# Patient Record
Sex: FEMALE | Race: BLACK OR AFRICAN AMERICAN | NOT HISPANIC OR LATINO | URBAN - METROPOLITAN AREA
[De-identification: names, ages, dates, MRNs, and addresses within clinical notes are randomized per-mention and may not be internally consistent; named-entity substitution may affect disease eponyms.]

---

## 2018-01-22 ENCOUNTER — IMPORTED ENCOUNTER (OUTPATIENT)
Dept: URBAN - METROPOLITAN AREA CLINIC 1 | Facility: CLINIC | Age: 83
End: 2018-01-22

## 2018-01-22 PROBLEM — Z96.1: Noted: 2018-01-22

## 2018-01-22 PROBLEM — H04.123: Noted: 2018-01-22

## 2018-01-22 PROBLEM — H26.491: Noted: 2018-01-22

## 2018-01-22 PROBLEM — H16.143: Noted: 2018-01-22

## 2018-01-22 PROBLEM — H40.1232: Noted: 2018-01-22

## 2018-01-22 PROBLEM — H43.812: Noted: 2018-01-22

## 2018-01-22 PROCEDURE — 92014 COMPRE OPH EXAM EST PT 1/>: CPT

## 2018-01-22 PROCEDURE — 92133 CPTRZD OPH DX IMG PST SGM ON: CPT

## 2018-01-22 NOTE — PATIENT DISCUSSION
1.  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 IOP stable on current gtts. Cont Latanoprost QHS OU. Compliance with mesd. 2.  PCO OD- Observe. 3.  Pseudophakia OU - (Standard OU) 4. KASSI w/ PEK OU- Begin ATs QID OU Routinely. 5.  PVD w/o Tear OS- RD precautions. Letter to PCP Return for an appointment in 6 mo 10 VF 24-2 OU glare with Dr. Brodie Garcia.

## 2018-07-30 ENCOUNTER — IMPORTED ENCOUNTER (OUTPATIENT)
Dept: URBAN - METROPOLITAN AREA CLINIC 1 | Facility: CLINIC | Age: 83
End: 2018-07-30

## 2018-07-30 PROBLEM — H26.491: Noted: 2018-07-30

## 2018-07-30 PROBLEM — H40.1232: Noted: 2018-07-30

## 2018-07-30 PROBLEM — Z96.1: Noted: 2018-07-30

## 2018-07-30 PROBLEM — H16.143: Noted: 2018-07-30

## 2018-07-30 PROBLEM — H04.123: Noted: 2018-07-30

## 2018-07-30 PROCEDURE — 92012 INTRM OPH EXAM EST PATIENT: CPT

## 2018-07-30 PROCEDURE — 92015 DETERMINE REFRACTIVE STATE: CPT

## 2018-07-30 PROCEDURE — 92083 EXTENDED VISUAL FIELD XM: CPT

## 2018-07-30 NOTE — PATIENT DISCUSSION
1.  Moderate Low tension glaucoma OU -(.85 OU) IOP stable OU . Early VF defects noted OU. -Patient to continue with current gtt regimen. (latanoprost OU QHS) Condition was discussed with patient and patient understands. Will continue to monitor patient for any progression in condition. Patient was advised to call us with any problems questions or concerns. 2.  KASSI w/ PEK OU-The increase of artificial tears OU BID Routinely were recommended. 3.  Pseudophakia OU - standard4. PCO  OD (Posterior Capsule Opacification)  Observe and consider yag cap when pt feels pco visually significant and visual acuity decreases to appropriate level. 5. Return for an appointment for 6mo/30 OCT with Dr. Jasmyn Miller.

## 2019-02-11 ENCOUNTER — IMPORTED ENCOUNTER (OUTPATIENT)
Dept: URBAN - METROPOLITAN AREA CLINIC 1 | Facility: CLINIC | Age: 84
End: 2019-02-11

## 2019-02-11 PROBLEM — H40.1232: Noted: 2019-02-11

## 2019-02-11 PROBLEM — H26.493: Noted: 2019-02-11

## 2019-02-11 PROBLEM — H43.812: Noted: 2019-02-11

## 2019-02-11 PROBLEM — H16.143: Noted: 2019-02-11

## 2019-02-11 PROBLEM — H04.123: Noted: 2019-02-11

## 2019-02-11 PROBLEM — Z96.1: Noted: 2019-02-11

## 2019-02-11 PROBLEM — H26.491: Noted: 2019-02-11

## 2019-02-11 PROCEDURE — 92133 CPTRZD OPH DX IMG PST SGM ON: CPT

## 2019-02-11 PROCEDURE — 92014 COMPRE OPH EXAM EST PT 1/>: CPT

## 2019-02-11 NOTE — PATIENT DISCUSSION
1.  Moderate Low Tension Glaucoma OU (CD 0.85 OU) - No progression by OCT. IOP stable cont Latanoprost QHS OU. Condition was discussed with patient and patient understands. Will continue to monitor patient for any progression in condition. Patient was advised to call us with any problems questions or concerns. 2.  KASSI w/ PEK OU- Recommend ATs TID OU routinely 3. PCO  OD (Posterior Capsule Opacification)  Observe and consider yag cap when pt feels pco visually significant and visual acuity decreases to appropriate level. 4. Pseudophakia OU - (Standard OU) 5. PVD w/o Tear OS- RD precautions. Return for an appointment in 6 months 10/HVF with Dr. Yordy Wagoner.

## 2019-09-16 ENCOUNTER — IMPORTED ENCOUNTER (OUTPATIENT)
Dept: URBAN - METROPOLITAN AREA CLINIC 1 | Facility: CLINIC | Age: 84
End: 2019-09-16

## 2019-09-16 PROBLEM — H40.1232: Noted: 2019-09-16

## 2019-09-16 PROCEDURE — 92083 EXTENDED VISUAL FIELD XM: CPT

## 2019-09-16 PROCEDURE — 92012 INTRM OPH EXAM EST PATIENT: CPT

## 2019-09-16 NOTE — PATIENT DISCUSSION
1.  Moderate Low Tension Glaucoma OU (CD 0.85 OU) - Slight progression by OCT OU. IOP stable OU cont Latanoprost QHS OU. Consider adding additional gtt with any more future progression. 2.  KASSI w/ PEK OU- Cont ATs TID OU routinely 3. PCO OD (Posterior Capsule Opacification)   4. Pseudophakia OU - (Standard OU) 5. PVD w/o Tear OSReturn for an appointment in 6 mo 30/OCT with Dr. Zurdo Diana.

## 2019-11-19 ENCOUNTER — IMPORTED ENCOUNTER (OUTPATIENT)
Dept: URBAN - METROPOLITAN AREA CLINIC 1 | Facility: CLINIC | Age: 84
End: 2019-11-19

## 2019-11-19 PROBLEM — H40.1233: Noted: 2019-11-19

## 2019-11-19 PROCEDURE — 92012 INTRM OPH EXAM EST PATIENT: CPT

## 2019-11-19 NOTE — PATIENT DISCUSSION
KASSI w/ PEK OU- Cont ATs TID OU routinely 3. PCO OD (Posterior Capsule Opacification)   4. Pseudophakia OU - (Standard OU) 5. PVD w/o Tear OSReturn for an appointment for Return as scheduled 30/OCT with Dr. Lili Brambila.

## 2019-11-19 NOTE — PATIENT DISCUSSION
1.  Advanced Low tension glaucoma OU - HVF shows progression OU superior and inferior arcuate. IOP stable cont Latanoprost QHS OU. **Add gtt if any progression by OCT. Condition was discussed with patient and patient understands. Will continue to monitor patient for any progression in condition. Patient was advised to call us with any problems questions or concerns. 2.  KASSI w/ PEK OU- Cont ATs TID OU routinely 3. PCO OD (Posterior Capsule Opacification)   4. Pseudophakia OU - (Standard OU) 5. PVD w/o Tear OSReturn for an appointment for Return as scheduled 30/OCT with Dr. Laney Street.

## 2020-03-16 ENCOUNTER — IMPORTED ENCOUNTER (OUTPATIENT)
Dept: URBAN - METROPOLITAN AREA CLINIC 1 | Facility: CLINIC | Age: 85
End: 2020-03-16

## 2020-03-16 PROBLEM — H04.123: Noted: 2020-03-16

## 2020-03-16 PROBLEM — H26.491: Noted: 2020-03-16

## 2020-03-16 PROBLEM — H16.143: Noted: 2020-03-16

## 2020-03-16 PROBLEM — Z96.1: Noted: 2020-03-16

## 2020-03-16 PROBLEM — H43.812: Noted: 2020-03-16

## 2020-03-16 PROBLEM — H40.1233: Noted: 2020-03-16

## 2020-03-16 PROCEDURE — 92133 CPTRZD OPH DX IMG PST SGM ON: CPT

## 2020-03-16 PROCEDURE — 92014 COMPRE OPH EXAM EST PT 1/>: CPT

## 2020-03-16 NOTE — PATIENT DISCUSSION
1.  Advanced tension glaucoma OU (CD 0.85 OU) - No progression by OCT. IOP stable cont Latanoprost QHS OU. Condition was discussed with patient and patient understands. Will continue to monitor patient for any progression in condition. Patient was advised to call us with any problems questions or concerns. 2.  KASSI w/ PEK OU- Recommend ATs TID OU routinely 3. PCO  OD (Posterior Capsule Opacification)  Observe and consider yag cap when pt feels pco visually significant and visual acuity decreases to appropriate level. 4. Pseudophakia OU - (Standard OU) 5. PVD w/o Tear OS- RD precautions. Return for an appointment in October 10/[facility] 24-2 with Dr. Keysha Canela.

## 2020-07-30 NOTE — PATIENT DISCUSSION
Surgery  Counseling: I have discussed the option of glasses versus cataract surgery versus following . It was explained that when vision no longer meets the patient's visual needs and a new prescription for glasses is not likely to improve all of the patient's visual symptoms, the option of cataract surgery is a reasonable next step. It was explained that there is no guarantee that removing the cataract will improve their visual symptoms, however; it is believed that the cataract is contributing to the patient's visual impairment and surgery may significantly improve both the visual and functional status of the patient. The risks, benefits and alternatives of surgery were discussed with the patient. After this discussion, the patient desires to proceed with cataract surgery with implantation of an intraocular lens to improve vision for distance and glare .

## 2020-07-30 NOTE — PATIENT DISCUSSION
Continue: prednisol ace-gatiflox-bromfen (prednisol ace-gatiflox-bromfen): drops,suspension: 1-0.5-0.075% 1 drop three times a day as directed into affected eye 07-

## 2020-07-30 NOTE — PATIENT DISCUSSION
***This patient had refractive cataract surgery performed. A toric IOL was placed to achieve a target refraction of plano (which should provide them with satisfactory distance vision with the aid of glasses/contact lenses). ***

## 2020-08-13 NOTE — PATIENT DISCUSSION
***This patient had traditional / refractive cataract surgery performed. A monofocal IOL was placed to achieve a target refraction of plano (which should provide them with satisfactory near vision / vision with the aid of glasses/contact lenses). ***

## 2020-09-18 NOTE — PATIENT DISCUSSION
TRABECULECTOMY WITH MITOMYCIN LEFT EYE: Patient instructed on glaucoma diagnosis. IOP in OS not at target. I have reviewed the findings and  have asked appropriate questions. Patient will schedule surgical intervention to reduce the risk of progression . Risks, benefits and alternatives explained. The patient understands that vision will not recover and that this procedure is to try to slow progressive deterioration of the vision and visual field in this eye. I have also reviewed the need for follow up and possible suture lysis in the post operative period. Patient agrees with plan and has decided to schedule Trabeculectomy with Mitomycin-C in the left eye. Patient will use Ofloxacin QID in OS starting 3 days prior to surgery. Informed consents were reviewed with patient and signed. A copy was provided along with pre-operative instructions.  Patient is scheduled for

## 2020-09-18 NOTE — PATIENT DISCUSSION
GLAUCOMA:  I have talked with the patient about my impressions, explained our treatment plan, and have answered all questions and patient understands. Continue present eye drops. D/C maxitrol due to steroid response. Added Erythromycin to replace this. Will have her return for a pressure check post maxitrol use.  Will also schedule surgery date today for [facility]. in October

## 2020-09-18 NOTE — PATIENT DISCUSSION
New Prescription: prednisolone acetate (prednisolone acetate): drops,suspension: 1% 1 drop four times a day into left eye 09-

## 2020-09-18 NOTE — PATIENT DISCUSSION
Continue: timolol maleate (timolol maleate): drops: 0.5% 1 drop twice a day as directed into both eyes 04-

## 2020-09-18 NOTE — PATIENT DISCUSSION
Stopped Today: Maxitrol (neomycin-polymyxin-dexameth): ointment: 3.5-10,000-0.1 mg-unit/g-% a small amount at bedtime as directed into both eyes 06-

## 2020-09-18 NOTE — PATIENT DISCUSSION
New Prescription: erythromycin (erythromycin): ointment: 5 mg/gram (0.5 %) a small amount once a day as directed into right eye 09-

## 2020-10-12 NOTE — PATIENT DISCUSSION
Continue: erythromycin (erythromycin): ointment: 5 mg/gram (0.5 %) a small amount once a day as directed into right eye 09-

## 2020-10-12 NOTE — PATIENT DISCUSSION
IOP continuing to climb in the left eye. Patient scheduled to proceed with surgical intervention October 21, 2020.

## 2020-10-12 NOTE — PATIENT DISCUSSION
Continue: prednisolone acetate (prednisolone acetate): drops,suspension: 1% 1 drop four times a day into left eye 09-

## 2020-10-12 NOTE — PATIENT DISCUSSION
TRABECULECTOMY WITH MITOMYCIN LEFT EYE: Patient instructed on glaucoma diagnosis. IOP in OS not at target. I have reviewed the findings and  have asked appropriate questions. Patient will schedule surgical intervention to reduce the risk of progression . Risks, benefits and alternatives explained. The patient understands that vision will not recover and that this procedure is to try to slow progressive deterioration of the vision and visual field in this eye. I have also reviewed the need for follow up and possible suture lysis in the post operative period. Patient agrees with plan and has decided to schedule Trabeculectomy with Mitomycin-C in the left eye. Patient will use Ofloxacin QID in OS starting 3 days prior to surgery. Informed consents were reviewed with patient and signed. A copy was provided along with pre-operative instructions.

## 2020-10-22 NOTE — PATIENT DISCUSSION
POST-OP INSTRUCTIONS: Reviewed instructions for post op care with patient. Pressure is low, but usually stabilizes at around 5 to 6 days. Instructed  no exercise and keep head higher than heart until eye stabilizes. Instructed on how to use drops. Instructed to call if pain, redness, or decreased vision occurs. Do not use any glaucoma drops in OS, but continue drops in OD. Use pred-forte QID OS will slowly taper for the next 4 months. Ofloxacin QID OS for 10 days. A copy of Post op instructions were reviewed with patient  and a copy was given to patient today.

## 2020-10-30 NOTE — PATIENT DISCUSSION
GLAUCOMA:  I have talked with the patient about my impressions, explained our treatment plan, and have answered all questions and patient understands. Continue present eye drops OD. Stop glaucoma drops OS. Increase PF OS to 6 times aday. Gentle massage TID. Patient to follow up 2 weeks

## 2020-11-13 NOTE — PATIENT DISCUSSION
3 WEEK PO :  I have talked with the patient about my impressions, explained our treatment plan, and have answered all questions and patient understands. Continue present eye drops. Return for PO LOAN December 15th.

## 2020-12-15 NOTE — PATIENT DISCUSSION
GLAUCOMA:  I have talked with the patient about my impressions, explained our treatment plan, and have answered all questions and patient understands. Continue present eye drops. Patient to follow up April - dilate OU.  No testing

## 2020-12-29 ENCOUNTER — IMPORTED ENCOUNTER (OUTPATIENT)
Dept: URBAN - METROPOLITAN AREA CLINIC 1 | Facility: CLINIC | Age: 85
End: 2020-12-29

## 2020-12-29 PROBLEM — H40.1233: Noted: 2020-12-29

## 2020-12-29 PROCEDURE — 99213 OFFICE O/P EST LOW 20 MIN: CPT

## 2020-12-29 NOTE — PATIENT DISCUSSION
1.  Advanced tension glaucoma OU (CD 0.85 OU) - IOP stable cont Latanoprost QHS OU. Condition was discussed with patient and patient understands. Will continue to monitor patient for any progression in condition. Patient was advised to call us with any problems questions or concerns. 2.  KASSI w/ PEK OU- Recommend ATs TID OU routinely 3. PCO  OD (Posterior Capsule Opacification)  Observe and consider yag cap when pt feels pco visually significant and visual acuity decreases to appropriate level. 4. Pseudophakia OU - (Standard OU) 5. H/o PVD w/o Tear OSReturn for an appointment in 4 months 30/OCT with Dr. Keysha Canela.

## 2021-04-27 ENCOUNTER — IMPORTED ENCOUNTER (OUTPATIENT)
Dept: URBAN - METROPOLITAN AREA CLINIC 1 | Facility: CLINIC | Age: 86
End: 2021-04-27

## 2021-04-27 PROBLEM — H26.491: Noted: 2021-04-27

## 2021-04-27 PROBLEM — H43.812: Noted: 2021-04-27

## 2021-04-27 PROBLEM — H04.123: Noted: 2021-04-27

## 2021-04-27 PROBLEM — H40.1233: Noted: 2021-04-27

## 2021-04-27 PROBLEM — Z96.1: Noted: 2021-04-27

## 2021-04-27 PROBLEM — H16.143: Noted: 2021-04-27

## 2021-04-27 PROCEDURE — 99214 OFFICE O/P EST MOD 30 MIN: CPT

## 2021-04-27 PROCEDURE — 92133 CPTRZD OPH DX IMG PST SGM ON: CPT

## 2021-04-27 NOTE — PATIENT DISCUSSION
1.  Advanced Low tension glaucoma OU (CD 0.85 OU) - No progression by OCT. IOP stable continue Latanoprost QHS OU. Condition was discussed with patient and patient understands. Will continue to monitor patient for any progression in condition. Patient was advised to call us with any problems questions or concerns. 2.  KASSI w/ PEK OU- Recommend ATs QID OU routinely 3. PCO  OD (Posterior Capsule Opacification)  Observe and consider yag cap when pt feels pco visually significant and visual acuity decreases to appropriate level. 4. Pseudophakia OU - (Standard OU) 5. PVD w/o Tear OS- RD precautions. Return for an appointment in 6 months 10/HVF with Dr. Zina Tyler.

## 2021-04-30 NOTE — PATIENT DISCUSSION
Stopped Today: prednisolone acetate (prednisolone acetate): drops,suspension: 1% 1 drop four times a day into left eye 09-

## 2021-04-30 NOTE — PATIENT DISCUSSION
CORNEAL EDEMA:  I have talked with the patient about my impressions, explained our treatment plan, and have answered all questions. Started [facility] in the right eye. Would like for her to follow up with Dr. Sorin Bingham for further evaluation.

## 2021-09-30 NOTE — PATIENT DISCUSSION
"Multifocal, Trifocal and Extended-depth-of-focus lens (EDOF) - It was explained that multifocal/trifocal and extended depth of focus lenses split light and this can be associated with a decrease in contrast sensitivity, depth of focus, a double image and ghosting which may or may not resolve over time. Multifocal/trifocal lenses are lighting dependent. In low or dim lighting, glasses may be needed for optimal near vision. It was explained that rings, halos, starbursts or spider webs around point sources of light (headlights, tail lights, street lights, neon signs, the moon, stars, etc.) will be present indefinitely after multifocal/trifocal/EDOF lens implantation (with the exception of the Vivity MF IOL) While the majority of patients do not find this limit's their night time activities to include driving, in rare instances, it can.  It was explained that these lenses are designed to satisfy a defined area of near vision, or ""sweet spot""
"Surgery Drops: I have given the patient the option to chose whether they would like a prescribed regimen of drops or an all-in-one drop for use before and after cataract surgery. They have elected to use the all-in-one option of Pred-Gati-Brom (prednisolone acetate, gatifloxacin and bromfenac). Patient to administer ""as directed"". "
***Refer to Patient Visual Assessment Questionnaire (scanned into document center). ***
**For patient's undergoing cataract surgery- In the event you decline a refractive package, traditional cataract surgery will be performed and a monofocal IOL will be implanted. Patient understands that they will need glasses for distance, intermediate and near vision.
CATARACT, OU - VISUALLY SIGNIFICANT OD &gt; OS. SCHEDULE SX OD THEN LATER IN OS IF VISUAL SYMPTOMS PERSIST.  GLS RX GIVEN TO FILL IF DESIRES IN THE EVENT PT DOES NOT PROCEED W/ SX.
COUNSELING:
Continue: prednisol ace-gatiflox-bromfen (prednisol ace-gatiflox-bromfen): drops,suspension: 1-0.5-0.075% 1 drop three times a day as directed into affected eye 07-
General:
I have discussed the options of a multifocal/trifocal and extended-depth-of-focus lens. It was explained that these options provide vision at either distance/intermediate or distance/near or distance/intermediate/near; however, compromises to achieve vision at varying distances do exist and these compromises may result in a reduced quality of vision. It was explained to the patient that the visual performance and dependence on glasses varies from patient to patient and while some may perform activities without glasses there are others who need glasses for most or all of their activities. It was emphasized to the patient that multifocal/trifocal and EDOF options do not eliminate the need for glasses at all times. It was emphasized to the patient that the goal of refractive cataract surgery is reducing spectacle dependence not complete spectacle freedom.
It was discussed that while the success rate of cataract surgery is high (success being defined as removing a lens and replacing it with an artificial lens) the success rate of getting the prescription corrected to meet or approximate the patients goals is lower than it is with LASIK (about 75-80%). These options include: correction of astigmatism with limbal relaxing incision(s), LenSx arcuate incision(s) and/or a toric IOL, monovision, nanovision, multifocal/trifocal IOL, and EDOF IOL. The patient was advised that if refractive cataract surgery does not meet their expectations and glasses provide an increased quality of vision, glasses would be prescribed and if the patient is a candidate, a LASIK touch up may be offered. However, if the lenses are limiting their vision or activities and the vision is neither correctable nor satisfactory with glasses, then an IOL exchange may be considered.
Medications:
REFRACTIVE ERROR, OU - DISCUSSED OPTION OF CORRECTING AT THE TIME OF CATARACT SURGERY. PT UNDERSTANDS AND ELECTS TO CONSIDER THEIR OPTIONS.
Refractive Error - a problem focusing light accurately onto the retina due to the shape of the eye. Presbyopia is a gradual, age-related loss of the eye's ability to focus actively on nearby objects. The most common types of refractive error are near-sightedness; far-sightedness, astigmatism, and presbyopia. I have discussed the options for refractive surgery to decrease dependency on glasses and/or contact lenses after surgery.
Refractive error / Presbyopic Correction Counseling:
Start 1 day prior to surgery
Surgery Counseling: I have discussed the option of glasses versus cataract surgery versus following. It was explained that when the patients vision no longer meets their visual needs and a glasses prescription does not improve visual symptoms, the option of cataract surgery is a reasonable next step. It was explained that there is no guarantee that removing the cataract will improve their visual symptoms, however; it is believed that the cataract is contributing to the patient's visual impairment and surgery may improve both the visual and functional status of the patient. The risks, benefits and alternatives of surgery were discussed with the patient. After this discussion, the patient desires to proceed with cataract surgery with implantation of an intraocular lens to improve vision for glare while driving at night.
spouse

## 2021-11-01 ENCOUNTER — PREPPED CHART (OUTPATIENT)
Dept: URBAN - METROPOLITAN AREA CLINIC 1 | Facility: CLINIC | Age: 86
End: 2021-11-01

## 2021-11-01 ENCOUNTER — IMPORTED ENCOUNTER (OUTPATIENT)
Dept: URBAN - METROPOLITAN AREA CLINIC 1 | Facility: CLINIC | Age: 86
End: 2021-11-01

## 2021-11-01 PROBLEM — H04.123: Noted: 2021-11-01

## 2021-11-01 PROBLEM — H40.1233: Noted: 2021-11-01

## 2021-11-01 PROBLEM — H16.143: Noted: 2021-11-01

## 2021-11-01 PROCEDURE — 92012 INTRM OPH EXAM EST PATIENT: CPT

## 2021-11-01 PROCEDURE — 92083 EXTENDED VISUAL FIELD XM: CPT

## 2021-11-01 NOTE — PATIENT DISCUSSION
1.  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 IOP stable continue Latanoprost QHS OU. Condition was discussed with patient and patient understands. Will continue to monitor patient for any progression in condition. Patient was advised to call us with any problems questions or concerns. 2.  KASSI w/ PEK OU - Recommend ATs QID OU routinely 3. PCO OD - (Posterior Capsule Opacification)  Observe and consider yag cap when pt feels pco visually significant and visual acuity decreases to appropriate level. 4. Pseudophakia OU - (Standard OU) 5. PVD w/o Tear OS- RD precautions. Return for an appointment in 6 months 30/OCT/glare with Dr. Keysha Canela.

## 2021-11-09 NOTE — PATIENT DISCUSSION
Target IOP 21 and below. IOP ok for now on Azopt and Timolol. No known allergy to other medications at this time. Can try other drops if needed.

## 2022-02-22 ENCOUNTER — ESTABLISHED PATIENT (OUTPATIENT)
Dept: URBAN - METROPOLITAN AREA CLINIC 1 | Facility: CLINIC | Age: 87
End: 2022-02-22

## 2022-02-22 DIAGNOSIS — B02.32: ICD-10-CM

## 2022-02-22 PROCEDURE — 92012 INTRM OPH EXAM EST PATIENT: CPT

## 2022-02-22 ASSESSMENT — TONOMETRY
OS_IOP_MMHG: 14
OD_IOP_MMHG: 12
OS_IOP_MMHG: 11
OD_IOP_MMHG: 12

## 2022-02-22 ASSESSMENT — VISUAL ACUITY
OS_CC: 20/20
OS_CC: 20/60
OD_CC: J1
OD_CC: 20/30 -2
OD_CC: 20/25
OS_CC: J1

## 2022-02-22 NOTE — PATIENT DISCUSSION
(CD 0.85 OU) - IOP stable, continue Latanoprost QHS OU. Condition was discussed with patient and patient understands. Will continue to monitor patient for any progression in condition. Patient was advised to call us with any problems, questions, or concerns.

## 2022-03-03 ENCOUNTER — FOLLOW UP (OUTPATIENT)
Dept: URBAN - METROPOLITAN AREA CLINIC 1 | Facility: CLINIC | Age: 87
End: 2022-03-03

## 2022-03-03 DIAGNOSIS — B02.32: ICD-10-CM

## 2022-03-03 DIAGNOSIS — G50.0: ICD-10-CM

## 2022-03-03 PROCEDURE — 99213 OFFICE O/P EST LOW 20 MIN: CPT

## 2022-03-03 ASSESSMENT — VISUAL ACUITY
OS_SC: 20/60+2
OD_SC: 20/50+1

## 2022-03-03 ASSESSMENT — TONOMETRY
OS_IOP_MMHG: 12
OD_IOP_MMHG: 12

## 2022-03-03 NOTE — PATIENT DISCUSSION
(CD 0.85 OU) IOP stable. Continue Latanoprost QHS OU. Condition was discussed with patient and patient understands. Will continue to monitor patient for any progression in condition. Patient was advised to call us with any problems, questions, or concerns.

## 2022-03-03 NOTE — PATIENT DISCUSSION
Quiet. Continue Pred BID OS. Continue Valtrex 500mg PO BID x 1 month disp#60.  D/c Erthromycin Marianne.

## 2022-04-03 ASSESSMENT — VISUAL ACUITY
OD_SC: 20/30
OD_SC: 20/30-2
OS_SC: 20/30-2
OD_SC: 20/30-2
OD_SC: 20/30
OD_SC: 20/25
OS_SC: 20/20
OD_CC: J2
OD_GLARE: 20/400
OS_CC: 20/50
OD_SC: 20/60
OD_SC: 20/30
OS_SC: 20/30
OS_CC: J1
OS_GLARE: 20/400
OS_CC: J2
OS_SC: 20/40
OD_CC: J1
OD_GLARE: 20/400
OD_CC: 20/50
OS_SC: 20/30
OU_SC: 20/30
OS_SC: 20/25
OU_CC: J1-2
OS_SC: 20/30
OS_CC: J1
OS_SC: 20/50
OD_SC: 20/30
OS_SC: 20/40+1
OD_CC: J1
OD_SC: 20/30

## 2022-04-03 ASSESSMENT — TONOMETRY
OS_IOP_MMHG: 11
OD_IOP_MMHG: 12
OS_IOP_MMHG: 11
OD_IOP_MMHG: 13
OS_IOP_MMHG: 12
OD_IOP_MMHG: 12
OS_IOP_MMHG: 13
OS_IOP_MMHG: 12
OD_IOP_MMHG: 12
OS_IOP_MMHG: 12
OS_IOP_MMHG: 11
OD_IOP_MMHG: 11
OD_IOP_MMHG: 11
OS_IOP_MMHG: 12
OS_IOP_MMHG: 11
OD_IOP_MMHG: 11

## 2022-05-13 ENCOUNTER — COMPREHENSIVE EXAM (OUTPATIENT)
Dept: URBAN - METROPOLITAN AREA CLINIC 1 | Facility: CLINIC | Age: 87
End: 2022-05-13

## 2022-05-13 DIAGNOSIS — B02.32: ICD-10-CM

## 2022-05-13 DIAGNOSIS — H40.1233: ICD-10-CM

## 2022-05-13 DIAGNOSIS — H43.812: ICD-10-CM

## 2022-05-13 DIAGNOSIS — H04.123: ICD-10-CM

## 2022-05-13 DIAGNOSIS — Z96.1: ICD-10-CM

## 2022-05-13 DIAGNOSIS — H26.491: ICD-10-CM

## 2022-05-13 DIAGNOSIS — G50.0: ICD-10-CM

## 2022-05-13 PROCEDURE — 92015 DETERMINE REFRACTIVE STATE: CPT

## 2022-05-13 PROCEDURE — 99214 OFFICE O/P EST MOD 30 MIN: CPT

## 2022-05-13 ASSESSMENT — VISUAL ACUITY
OS_CC: J3
OD_CC: 20/30
OS_CC: 20/50
OD_CC: J3

## 2022-05-13 ASSESSMENT — TONOMETRY
OD_IOP_MMHG: 12
OS_IOP_MMHG: 11

## 2022-05-13 NOTE — PATIENT DISCUSSION
(CD 0.85 OU) Unable to preform OCT today. IOP stable. Continue Latanoprost QHS OU. Condition was discussed with patient and patient understands. Will continue to monitor patient for any progression in condition. Patient was advised to call us with any problems, questions, or concerns.

## 2022-05-13 NOTE — PATIENT DISCUSSION
Observe. Recommend patients daughter to tape left lower lid so it does not flip in. If tape does not help, will consider entropion repair.

## 2022-06-10 NOTE — PATIENT DISCUSSION
Will have her follow with Dr. Dodie Hamilton and Dr. Dodie Hamilton can refer to glaucoma specialist if indicated.

## 2022-06-10 NOTE — PATIENT DISCUSSION
IOP remains excellent on current regimen though OCT appears worse. Damage occurred while IOP was elevated in the 30's. Will continue to monitor given great IOP.

## 2022-08-15 ENCOUNTER — FOLLOW UP (OUTPATIENT)
Dept: URBAN - METROPOLITAN AREA CLINIC 1 | Facility: CLINIC | Age: 87
End: 2022-08-15

## 2022-08-15 DIAGNOSIS — B02.32: ICD-10-CM

## 2022-08-15 DIAGNOSIS — H02.025: ICD-10-CM

## 2022-08-15 DIAGNOSIS — H40.1233: ICD-10-CM

## 2022-08-15 DIAGNOSIS — H04.123: ICD-10-CM

## 2022-08-15 PROCEDURE — 99213 OFFICE O/P EST LOW 20 MIN: CPT

## 2022-08-15 ASSESSMENT — VISUAL ACUITY
OD_CC: 20/25-1
OS_CC: 20/40-2

## 2022-08-15 ASSESSMENT — TONOMETRY
OS_IOP_MMHG: 14
OD_IOP_MMHG: 12

## 2022-08-15 NOTE — PATIENT DISCUSSION
Observe. Diagnosis discussed in great detail with the patient's daughter today. Recommend patient's daughter to tape left lower lid so it does not flip in. If tape does not help, will consider entropion repair.

## 2022-08-15 NOTE — PATIENT DISCUSSION
Diagnosis discussed in great detail with the patient's daughter today. Recommend patient increase ATs QID OU, routinely.

## 2022-08-15 NOTE — PATIENT DISCUSSION
Quiet today. Okay to D/c the use of Pred Forte at this time. Patient is experiencing cranial nerve pain followed by neurology, considering transcranial magnetic stimulation.

## 2022-08-15 NOTE — PATIENT DISCUSSION
(CD 0.85 OU) IOP stable today at 12 OD, 14 OS. Continue Latanoprost QHS OU. Condition was discussed with patient and patient understands. Will continue to monitor patient for any progression in condition. Patient was advised to call us with any problems, questions, or concerns. F/u 3 months, 3-0, OCT ON.

## 2022-11-29 ENCOUNTER — COMPREHENSIVE EXAM (OUTPATIENT)
Dept: URBAN - METROPOLITAN AREA CLINIC 1 | Facility: CLINIC | Age: 87
End: 2022-11-29

## 2022-11-29 DIAGNOSIS — H02.834: ICD-10-CM

## 2022-11-29 DIAGNOSIS — H02.831: ICD-10-CM

## 2022-11-29 DIAGNOSIS — H04.123: ICD-10-CM

## 2022-11-29 DIAGNOSIS — H40.1233: ICD-10-CM

## 2022-11-29 DIAGNOSIS — H02.025: ICD-10-CM

## 2022-11-29 DIAGNOSIS — H26.491: ICD-10-CM

## 2022-11-29 DIAGNOSIS — B02.32: ICD-10-CM

## 2022-11-29 DIAGNOSIS — G50.0: ICD-10-CM

## 2022-11-29 DIAGNOSIS — H43.812: ICD-10-CM

## 2022-11-29 DIAGNOSIS — Z96.1: ICD-10-CM

## 2022-11-29 PROCEDURE — 99214 OFFICE O/P EST MOD 30 MIN: CPT

## 2022-11-29 ASSESSMENT — VISUAL ACUITY
OS_CC: J3
OD_CC: J3
OS_CC: 20/50
OD_CC: 20/30

## 2022-11-29 ASSESSMENT — TONOMETRY
OD_IOP_MMHG: 08
OS_IOP_MMHG: 08

## 2022-11-29 NOTE — PATIENT DISCUSSION
(CD 0.85 OU) IOP stable, continue Latanoprost QHS OU. Condition was discussed with patient and patient understands. Will continue to monitor patient for any progression in condition. Patient was advised to call us with any problems, questions, or concerns.

## 2022-11-29 NOTE — PATIENT DISCUSSION
Diagnosis discussed in great detail with the patient's daughter today. Recommend patient increase ATs Q2H OU, routinely.

## 2022-11-29 NOTE — PATIENT DISCUSSION
Secondary to HZV. Pt to follow up with PCP and Neurology as scheduled for facial pain.  Consider severing nerve if pain is to great, patient will discuss with Neurologist.

## 2022-11-29 NOTE — PATIENT DISCUSSION
Quiet today. Patient is experiencing cranial nerve pain followed by neurology, considering transcranial magnetic stimulation.